# Patient Record
Sex: MALE | Race: BLACK OR AFRICAN AMERICAN | NOT HISPANIC OR LATINO | Employment: FULL TIME | URBAN - METROPOLITAN AREA
[De-identification: names, ages, dates, MRNs, and addresses within clinical notes are randomized per-mention and may not be internally consistent; named-entity substitution may affect disease eponyms.]

---

## 2020-01-01 ENCOUNTER — NURSE TRIAGE (OUTPATIENT)
Dept: NURSING | Facility: CLINIC | Age: 30
End: 2020-01-01

## 2020-01-01 NOTE — TELEPHONE ENCOUNTER
Patient calling. Stats he needs to obtain his immunization records.    Explained that he will need to contact medical records as all of his immunizations are in historical records.    Will call clinic tomorrow for additional assistance.    Protocol and care advice reviewed    Caller states understanding of the recommended disposition    Advised to call back if further questions or concerns      Reason for Disposition    General information question, no triage required and triager able to answer question    Additional Information    Negative: RN needs further essential information from caller in order to complete triage    Negative: Health Information question, no triage required and triager able to answer question    Negative: Requesting regular office appointment    Negative: [1] Caller requesting NON-URGENT health information AND [2] PCP's office is the best resource    Protocols used: INFORMATION ONLY CALL-A-

## 2020-01-01 NOTE — TELEPHONE ENCOUNTER
States he wants appt. C/o sinus and nasal congestion and non-productive cough x2-3 wks. Had sore throat at first but not currently. No fever. Denies SOB. Denies sinus/facial pain. Advised see provider w/i 3 days. Warm transferred to scheduling.     Reason for Disposition    [1] Sinus congestion (pressure, fullness) AND [2] present > 10 days    Additional Information    Negative: Severe difficulty breathing (e.g., struggling for each breath, speaks in single words)    Negative: Sounds like a life-threatening emergency to the triager    Negative: Runny nose is caused by pollen or other allergies    Negative: Cough is main symptom    Negative: Severe sore throat    Negative: Fever > 104 F (40 C)    Negative: [1] Difficulty breathing AND [2] not severe AND [3] not from stuffy nose (e.g., not relieved by cleaning out the nose)    Negative: Patient sounds very sick or weak to the triager    Negative: [1] Fever > 101 F (38.3 C) AND [2] age > 60    Negative: [1] Fever > 100.0 F (37.8 C) AND [2] bedridden (e.g., nursing home patient, CVA, chronic illness, recovering from surgery)    Negative: [1] Fever > 100.0 F (37.8 C) AND [2] diabetes mellitus or weak immune system (e.g., HIV positive, cancer chemo, splenectomy, chronic steroids)    Negative: Fever present > 3 days (72 hours)    Negative: [1] Fever returns after gone for over 24 hours AND [2] symptoms worse or not improved    Negative: [1] Sinus pain (not just congestion) AND [2] fever    Negative: Earache    Negative: [1] SEVERE sore throat AND [2] present > 24 hours    Protocols used: COMMON COLD-A-AH

## 2020-01-02 ENCOUNTER — OFFICE VISIT (OUTPATIENT)
Dept: FAMILY MEDICINE | Facility: CLINIC | Age: 30
End: 2020-01-02
Payer: COMMERCIAL

## 2020-01-02 VITALS
HEART RATE: 97 BPM | RESPIRATION RATE: 16 BRPM | BODY MASS INDEX: 23.68 KG/M2 | SYSTOLIC BLOOD PRESSURE: 120 MMHG | TEMPERATURE: 97.9 F | WEIGHT: 165 LBS | OXYGEN SATURATION: 99 % | DIASTOLIC BLOOD PRESSURE: 76 MMHG

## 2020-01-02 DIAGNOSIS — J01.90 ACUTE SINUSITIS WITH SYMPTOMS > 10 DAYS: Primary | ICD-10-CM

## 2020-01-02 PROCEDURE — 99203 OFFICE O/P NEW LOW 30 MIN: CPT | Performed by: FAMILY MEDICINE

## 2020-01-02 ASSESSMENT — PAIN SCALES - GENERAL: PAINLEVEL: NO PAIN (0)

## 2020-01-02 NOTE — PROGRESS NOTES
Subjective     Macario Pascal is a 29 year old male who presents to clinic today for the following health issues:    HPI   Acute Illness   Acute illness concerns: cough and sinus problems   Onset: 2-3 weeks ago    Fever: no     Chills/Sweats: no     Headache (location?): no     Sinus Pressure:YES    Conjunctivitis:  no    Ear Pain: no    Rhinorrhea: YES    Congestion: YES    Sore Throat: no      Cough: YES    Wheeze: YES    Decreased Appetite: no     Nausea: no     Vomiting: no     Diarrhea:  no     Dysuria/Freq.: no     Fatigue/Achiness: no     Sick/Strep Exposure: no      Therapies Tried and outcome: OTC meds saline nasal rinse sleeping     Patient states he has been having 2 to 3 weeks of nasal congestion, rhinorrhea, sinus pressure, cough.  Sometimes he feels short of breath with wheezing and this is wax and wane during his illness.  He has no history of asthma.  He did previously smoke but quit.  He smoked approximately 1/3 pack/day for 10 years.  He has not had any recent travel or sick contacts.  He has no medication allergies.  No previous surgeries and no prior medical problems.    He denies any rash or urinary symptoms or GI symptoms.    Patient Active Problem List   Diagnosis     CARDIOVASCULAR SCREENING; LDL GOAL LESS THAN 160     Past Surgical History:   Procedure Laterality Date     NO HISTORY OF SURGERY         Social History     Tobacco Use     Smoking status: Former Smoker     Packs/day: 0.33     Years: 10.00     Pack years: 3.30     Smokeless tobacco: Never Used   Substance Use Topics     Alcohol use: No     Family History   Problem Relation Age of Onset     Diabetes Mother      Hypertension Father      Macular Degeneration Father      Prostate Cancer No family hx of      Colon Cancer No family hx of      Breast Cancer No family hx of      Ulcerative Colitis No family hx of      Crohn's Disease No family hx of      Celiac Disease No family hx of          Current Outpatient Medications    Medication Sig Dispense Refill     amoxicillin-clavulanate (AUGMENTIN) 875-125 MG tablet Take 1 tablet by mouth 2 times daily for 10 days 20 tablet 0     No Known Allergies    Reviewed and updated as needed this visit by Provider  Tobacco  Allergies  Meds  Problems  Med Hx  Surg Hx  Fam Hx  Soc Hx        Review of Systems   ROS COMP: Constitutional, HEENT, lymph, Derm, MSK, cardiovascular, pulmonary, gi and gu systems are negative, except as otherwise noted.      Objective    /76 (BP Location: Left arm, Patient Position: Sitting, Cuff Size: Adult Regular)   Pulse 97   Temp 97.9  F (36.6  C) (Temporal)   Resp 16   Wt 74.8 kg (165 lb)   SpO2 99%   BMI 23.68 kg/m    Body mass index is 23.68 kg/m .  Physical Exam   General: Appears well and in no acute distress.  HEENT: Bilateral edematous nasal turbinates.  Eyes grossly normal to inspection. Extraocular movements intact. Pupils equal, round, and reactive to light. Mucous membranes moist. No ulcers or lesions noted in the oropharynx.  Normal TMs and ear canals.  Heme/Lymph: No supraclavicular, anterior, or posterior cervical lymphadenopathy.   Cardiovascular: Regular rate and rhythm, normal S1 and S2 without murmur. No extra heartsounds or friction rub. Radial pulses present and equal bilaterally.  Respiratory: Lungs clear to auscultation bilaterally. No wheezing or crackles. No prolonged expiration. Symmetrical chest rise.  Musculoskeletal:  No gross extremity deformities. No peripheral edema. Normal muscle bulk.     Diagnostic Test Results:  none         Assessment & Plan   1. Acute sinusitis with symptoms > 10 days: Given duration of symptoms will treat with Augmentin x10 days.  Also recommended Afrin Flonase nasal sprays.  Not use Afrin longer than 3 days.  Can use cough drops and over-the-counter cough suppressants.  If not improving recommend PFTs and possible trial of antihistamines.  Follow-up in 1 week if not improving.  Discussed that  cough tends to linger longer than other symptoms.  Patient agreeable with this plan.  - amoxicillin-clavulanate (AUGMENTIN) 875-125 MG tablet; Take 1 tablet by mouth 2 times daily for 10 days  Dispense: 20 tablet; Refill: 0     Return in about 1 week (around 1/9/2020), or if symptoms worsen or fail to improve.    Slava Cordova MD  [facility]     This chart is completed utilizing dictation software; typos and/or incorrect word substitutions may unintentionally occur.

## 2020-01-02 NOTE — PATIENT INSTRUCTIONS
Important Takeaway Points From This Visit:    I have given you an antibiotic to take for your symptoms. Use it twice daily for 10 days.    A over the counter nasal spray that may be helpful is called Afrin. Do not use this longer than 3 days at a time.    For cough, any cough medication with DM (Mucinex DM, Tussin Dm, etc) will help with suppression.      As always, please call with any questions or concerns. I look forward to seeing you again soon!    Take care,  Dr. Cordova    Your current medication list is printed. Please keep this with you - it is helpful to bring this current list to any other medical appointments. It can also be helpful if you ever go to the emergency room or hospital.    If you had lab testing today we will call you with the results. The phone number we will call with your results is # [phone] (home) . If this is not the best number please call our clinic and change the number.    If you need any refills, please call your pharmacy and they will contact us.    If you have any further concerns or wish to schedule another appointment, please call our office at [phone].    If you have a medical emergency, please call [phone].    Thank you for coming to [facility]!

## 2020-01-06 NOTE — PROGRESS NOTES
SUBJECTIVE:   CC: Macario Pascal is an 29 year old male who presents for preventative health visit.     Healthy Habits:     Getting at least 3 servings of Calcium per day:  Yes    Bi-annual eye exam:  NO    Dental care twice a year:  NO    Sleep apnea or symptoms of sleep apnea:  None    Diet:  Regular (no restrictions)    Frequency of exercise:  None    Taking medications regularly:  Yes    Medication side effects:  Not applicable    PHQ-2 Total Score: 0    Additional concerns today:  No    Patient states he will see eye doctor and dentist.    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    Today's PHQ-2 Score:   PHQ-2 ( 1999 Pf[employer]) 1/8/2020   Q1: Little interest or pleasure in doing things 0   Q2: Feeling down, depressed or hopeless 0   PHQ-2 Score 0   Q1: Little interest or pleasure in doing things Not at all   Q2: Feeling down, depressed or hopeless Not at all   PHQ-2 Score 0     Abuse: Current or Past(Physical, Sexual or Emotional)- No  Do you feel safe in your environment? Yes    Social History     Tobacco Use     Smoking status: Former Smoker     Packs/day: 0.33     Years: 10.00     Pack years: 3.30     Smokeless tobacco: Never Used   Substance Use Topics     Alcohol use: No     If you drink alcohol do you typically have >3 drinks per day or >7 drinks per week? No    Alcohol Use 1/8/2020   Prescreen: >3 drinks/day or >7 drinks/week? Not Applicable   Prescreen: >3 drinks/day or >7 drinks/week? -   No flowsheet data found.    Last PSA: Not indicated at this time.    Reviewed orders with patient. Reviewed health maintenance and updated orders accordingly - Yes  BP Readings from Last 3 Encounters:   01/08/20 112/74   01/02/20 120/76   12/26/15 124/82    Wt Readings from Last 3 Encounters:   01/08/20 75.9 kg (167 lb 6.4 oz)   01/02/20 74.8 kg (165 lb)   12/26/15 59 kg (130 lb)                  Patient Active Problem List   Diagnosis     CARDIOVASCULAR SCREENING; LDL  GOAL LESS THAN 160     Past Surgical History:   Procedure Laterality Date     NO HISTORY OF SURGERY         Social History     Tobacco Use     Smoking status: Former Smoker     Packs/day: 0.33     Years: 10.00     Pack years: 3.30     Smokeless tobacco: Never Used   Substance Use Topics     Alcohol use: No     Family History   Problem Relation Age of Onset     Diabetes Mother      Hypertension Father      Macular Degeneration Father      Prostate Cancer No family hx of      Colon Cancer No family hx of      Breast Cancer No family hx of      Ulcerative Colitis No family hx of      Crohn's Disease No family hx of      Celiac Disease No family hx of          Current Outpatient Medications   Medication Sig Dispense Refill     amoxicillin-clavulanate (AUGMENTIN) 875-125 MG tablet Take 1 tablet by mouth 2 times daily for 10 days 20 tablet 0     No Known Allergies    Reviewed and updated as needed this visit by clinical staff  Tobacco  Allergies  Meds  Problems  Med Hx  Surg Hx  Fam Hx  Soc Hx          Reviewed and updated as needed this visit by Provider  Tobacco  Allergies  Meds  Problems  Med Hx  Surg Hx  Fam Hx        Past Medical History:   Diagnosis Date     NO ACTIVE PROBLEMS       Past Surgical History:   Procedure Laterality Date     NO HISTORY OF SURGERY       Review of Systems   Constitutional: Negative for chills and fever.   HENT: Positive for congestion. Negative for ear pain, hearing loss and sore throat.    Eyes: Negative for visual disturbance.   Respiratory: Positive for cough. Negative for shortness of breath.    Cardiovascular: Negative for chest pain, palpitations and peripheral edema.   Gastrointestinal: Negative for abdominal pain, constipation, diarrhea, heartburn, hematochezia and nausea.   Genitourinary: Positive for discharge. Negative for dysuria, frequency, genital sores, hematuria, impotence and urgency.   Musculoskeletal: Negative for arthralgias, joint swelling and myalgias.  "  Skin: Negative for rash.   Neurological: Negative for dizziness, weakness, headaches and paresthesias.   Psychiatric/Behavioral: Negative for mood changes. The patient is not nervous/anxious.      OBJECTIVE:   /74   Pulse 75   Temp 98.6  F (37  C) (Temporal)   Resp 12   Ht 1.765 m (5' 9.5\")   Wt 75.9 kg (167 lb 6.4 oz)   SpO2 99%   BMI 24.37 kg/m      Physical Exam  GENERAL: healthy, alert and no distress  EYES: Eyes grossly normal to inspection, PERRL and conjunctivae and sclerae normal  HENT: ear canals and TM's normal, nose and mouth without ulcers or lesions  NECK: no adenopathy, no asymmetry, masses, or scars and thyroid normal to palpation  RESP: lungs clear to auscultation - no rales, rhonchi or wheezes  CV: regular rate and rhythm, normal S1 S2, no S3 or S4, no murmur, click or rub, no peripheral edema and peripheral pulses strong  ABDOMEN: soft, nontender, no hepatosplenomegaly, no masses and bowel sounds normal  MS: no gross musculoskeletal defects noted, no edema  SKIN: no suspicious lesions or rashes  NEURO: Normal strength and tone, mentation intact and speech normal  PSYCH: mentation appears normal, affect normal/bright  : deferred by patient.    Diagnostic Test Results:  STD screen labs pending    ASSESSMENT/PLAN:   1. Routine general medical examination at a health care facility: Discussed seatbelt safety, helmet safety, sunscreen use, STD screening.  Updated vaccinations.  No other acute concerns today.  Improving from sinus infection previously seen on 1/2/2022.  Not otherwise specifically just today.  Mood good.  - VACCINE ADMINISTRATION, INITIAL  - VACCINE ADMINISTRATION, EACH ADDITIONAL  - TDAP VACCINE (ADACEL)  - INFLUENZA VACCINE IM > 6 MONTHS VALENT IIV4 [45616]  - Neisseria gonorrhoeae PCR  - Chlamydia trachomatis PCR  - HIV Antigen Antibody Combo  - Treponema Abs w Reflex to RPR and Titer    2. Screen for STD (sexually transmitted disease): Patient desires STD screening as " "it has been a while.  Not currently sexually active.  No concerning discharge, dysuria, or other symptoms.  - Neisseria gonorrhoeae PCR  - Chlamydia trachomatis PCR  - HIV Antigen Antibody Combo  - Treponema Abs w Reflex to RPR and Titer    3. Need for Tdap vaccination: Copy given to patient.  - VACCINE ADMINISTRATION, EACH ADDITIONAL  - TDAP VACCINE (ADACEL)    4. Needs flu shot  - VACCINE ADMINISTRATION, INITIAL  - INFLUENZA VACCINE IM > 6 MONTHS VALENT IIV4 [68129]     COUNSELING:   Reviewed preventive health counseling, as reflected in patient instructions       Regular exercise       Healthy diet/nutrition       Vision screening       Hearing screening       Immunizations    Vaccinated for: Influenza and TDAP         Safe sex practices/STD prevention    Estimated body mass index is 24.37 kg/m  as calculated from the following:    Height as of this encounter: 1.765 m (5' 9.5\").    Weight as of this encounter: 75.9 kg (167 lb 6.4 oz).          reports that he has quit smoking. He has a 3.30 pack-year smoking history. He has never used smokeless tobacco.      Counseling Resources:  ATP IV Guidelines  Pooled Cohorts Equation Calculator  FRAX Risk Assessment  ICSI Preventive Guidelines  Dietary Guidelines for Americans, 2010  USDA's MyPlate  ASA Prophylaxis  Lung CA Screening    Slava Cordova MD  [facility]     This chart is completed utilizing dictation software; typos and/or incorrect word substitutions may unintentionally occur.    "

## 2020-01-08 ENCOUNTER — OFFICE VISIT (OUTPATIENT)
Dept: FAMILY MEDICINE | Facility: CLINIC | Age: 30
End: 2020-01-08
Payer: COMMERCIAL

## 2020-01-08 VITALS
RESPIRATION RATE: 12 BRPM | BODY MASS INDEX: 23.96 KG/M2 | HEART RATE: 75 BPM | SYSTOLIC BLOOD PRESSURE: 112 MMHG | OXYGEN SATURATION: 99 % | HEIGHT: 70 IN | DIASTOLIC BLOOD PRESSURE: 74 MMHG | TEMPERATURE: 98.6 F | WEIGHT: 167.4 LBS

## 2020-01-08 DIAGNOSIS — Z00.00 ROUTINE GENERAL MEDICAL EXAMINATION AT A HEALTH CARE FACILITY: Primary | ICD-10-CM

## 2020-01-08 DIAGNOSIS — Z23 NEEDS FLU SHOT: ICD-10-CM

## 2020-01-08 DIAGNOSIS — Z11.3 SCREEN FOR STD (SEXUALLY TRANSMITTED DISEASE): ICD-10-CM

## 2020-01-08 DIAGNOSIS — Z23 NEED FOR TDAP VACCINATION: ICD-10-CM

## 2020-01-08 LAB — HIV 1+2 AB+HIV1 P24 AG SERPL QL IA: NONREACTIVE

## 2020-01-08 PROCEDURE — 87389 HIV-1 AG W/HIV-1&-2 AB AG IA: CPT | Performed by: FAMILY MEDICINE

## 2020-01-08 PROCEDURE — 99395 PREV VISIT EST AGE 18-39: CPT | Mod: 25 | Performed by: FAMILY MEDICINE

## 2020-01-08 PROCEDURE — 90715 TDAP VACCINE 7 YRS/> IM: CPT | Performed by: FAMILY MEDICINE

## 2020-01-08 PROCEDURE — 36415 COLL VENOUS BLD VENIPUNCTURE: CPT | Performed by: FAMILY MEDICINE

## 2020-01-08 PROCEDURE — 87491 CHLMYD TRACH DNA AMP PROBE: CPT | Performed by: FAMILY MEDICINE

## 2020-01-08 PROCEDURE — 90472 IMMUNIZATION ADMIN EACH ADD: CPT | Performed by: FAMILY MEDICINE

## 2020-01-08 PROCEDURE — 90686 IIV4 VACC NO PRSV 0.5 ML IM: CPT | Performed by: FAMILY MEDICINE

## 2020-01-08 PROCEDURE — 87591 N.GONORRHOEAE DNA AMP PROB: CPT | Performed by: FAMILY MEDICINE

## 2020-01-08 PROCEDURE — 90471 IMMUNIZATION ADMIN: CPT | Performed by: FAMILY MEDICINE

## 2020-01-08 PROCEDURE — 86780 TREPONEMA PALLIDUM: CPT | Performed by: FAMILY MEDICINE

## 2020-01-08 ASSESSMENT — ENCOUNTER SYMPTOMS
HEARTBURN: 0
PALPITATIONS: 0
PARESTHESIAS: 0
DIZZINESS: 0
FREQUENCY: 0
JOINT SWELLING: 0
FEVER: 0
CHILLS: 0
WEAKNESS: 0
DIARRHEA: 0
NAUSEA: 0
NERVOUS/ANXIOUS: 0
HEMATOCHEZIA: 0
HEMATURIA: 0
ARTHRALGIAS: 0
SHORTNESS OF BREATH: 0
SORE THROAT: 0
DYSURIA: 0
HEADACHES: 0
COUGH: 1
ABDOMINAL PAIN: 0
CONSTIPATION: 0
MYALGIAS: 0

## 2020-01-08 ASSESSMENT — MIFFLIN-ST. JEOR: SCORE: 1722.63

## 2020-01-09 ENCOUNTER — TELEPHONE (OUTPATIENT)
Dept: FAMILY MEDICINE | Facility: CLINIC | Age: 30
End: 2020-01-09

## 2020-01-09 LAB
C TRACH DNA SPEC QL NAA+PROBE: NEGATIVE
N GONORRHOEA DNA SPEC QL NAA+PROBE: NEGATIVE
SPECIMEN SOURCE: NORMAL
SPECIMEN SOURCE: NORMAL
T PALLIDUM AB SER QL: NONREACTIVE

## 2020-01-09 NOTE — TELEPHONE ENCOUNTER
Left message asking patient to return call.  Please inform patient of RESULTS from Provider below.       Your gonorrhea and chlamydia tests were normal.     Please call the clinic with any questions you may have.     Have a great day,     Dr. Schwab

## 2020-01-10 ENCOUNTER — TELEPHONE (OUTPATIENT)
Dept: FAMILY MEDICINE | Facility: CLINIC | Age: 30
End: 2020-01-10

## 2020-01-10 NOTE — TELEPHONE ENCOUNTER
Spoke to pt.  He will complete his portion of the form.  I email scanned it to patient, per his request.

## 2020-01-10 NOTE — TELEPHONE ENCOUNTER
Reason for call:  Form  Reason for Call:  Form, our goal is to have forms completed with 72 hours, however, some forms may require a visit or additional information.    Type of letter, form or note:  medical    Who is the form from?:  Patient    Where did the form come from: form was faxed in    What clinic location was the form placed at?: [facility] - [phone]    Where the form was placed: [facility]    What number is listed as a contact on the form?:          Additional comments:  Fax to  [facility] [phone] and call pt to inform him of completion.    created by Rachel Ponce

## 2020-01-10 NOTE — TELEPHONE ENCOUNTER
Signed form. Needs to be filled out by patient to fill out. Given to amparo to Fax and let patient know he needs to fill out his section.    Slava Cordova MD  [facility]

## 2020-12-13 ENCOUNTER — HEALTH MAINTENANCE LETTER (OUTPATIENT)
Age: 30
End: 2020-12-13

## 2021-02-21 ENCOUNTER — HEALTH MAINTENANCE LETTER (OUTPATIENT)
Age: 31
End: 2021-02-21

## 2021-09-26 ENCOUNTER — HEALTH MAINTENANCE LETTER (OUTPATIENT)
Age: 31
End: 2021-09-26

## 2022-03-13 ENCOUNTER — HEALTH MAINTENANCE LETTER (OUTPATIENT)
Age: 32
End: 2022-03-13

## 2023-01-14 ENCOUNTER — HEALTH MAINTENANCE LETTER (OUTPATIENT)
Age: 33
End: 2023-01-14

## 2023-04-23 ENCOUNTER — HEALTH MAINTENANCE LETTER (OUTPATIENT)
Age: 33
End: 2023-04-23

## 2023-11-21 ENCOUNTER — OFFICE VISIT (OUTPATIENT)
Dept: URGENT CARE | Facility: URGENT CARE | Age: 33
End: 2023-11-21

## 2023-11-21 ENCOUNTER — NURSE TRIAGE (OUTPATIENT)
Dept: NURSING | Facility: CLINIC | Age: 33
End: 2023-11-21

## 2023-11-21 VITALS
DIASTOLIC BLOOD PRESSURE: 77 MMHG | HEART RATE: 73 BPM | SYSTOLIC BLOOD PRESSURE: 125 MMHG | OXYGEN SATURATION: 99 % | WEIGHT: 178 LBS | RESPIRATION RATE: 16 BRPM | TEMPERATURE: 98.4 F

## 2023-11-21 DIAGNOSIS — H66.93 ACUTE OTITIS MEDIA, BILATERAL: Primary | ICD-10-CM

## 2023-11-21 PROCEDURE — 99203 OFFICE O/P NEW LOW 30 MIN: CPT | Performed by: NURSE PRACTITIONER

## 2023-11-21 RX ORDER — AMOXICILLIN 875 MG
875 TABLET ORAL 2 TIMES DAILY
Qty: 20 TABLET | Refills: 0 | Status: SHIPPED | OUTPATIENT
Start: 2023-11-21 | End: 2023-12-01

## 2023-11-21 NOTE — PROGRESS NOTES
SUBJECTIVE:  Aristeo Pascal is a 33 year old male who presents with bilateral ear pain for 1  day   Severity: moderate   Timing:sudden onset  Additional symptoms include congestion and cough.      History of recurrent otitis: no    History reviewed. No pertinent past medical history.  No current outpatient medications on file.     Social History     Tobacco Use    Smoking status: Never    Smokeless tobacco: Never   Substance Use Topics    Alcohol use: Not on file       ROS:   Review of systems negative except as stated above.    OBJECTIVE:  /77   Pulse 73   Temp 98.4  F (36.9  C) (Tympanic)   Resp 16   Wt 80.7 kg (178 lb)   SpO2 99%    EXAM:  The right TM is bulging and erythematous     The right auditory canal is normal and without drainage, edema or erythema  The left TM is bulging and erythematous  The left auditory canal is normal and without drainage, edema or erythema  Oropharynx exam is normal: no lesions, erythema, adenopathy or exudate.  GENERAL: no acute distress  EYES: EOMI,  PERRL, conjunctiva clear  NECK: supple, non-tender to palpation, no adenopathy noted  RESP: lungs clear to auscultation - no rales, rhonchi or wheezes  CV: regular rates and rhythm, normal S1 S2, no murmur noted  SKIN: no suspicious lesions or rashes     ASSESSMENT:  (H66.93) Acute otitis media, bilateral  (primary encounter diagnosis)    Plan: amoxicillin (AMOXIL) 875 MG tablet  Ibuprofen fluids rest home treat and monitor sx  Call if new or worsening      SERGIO Starkey CNP

## 2024-06-30 ENCOUNTER — HEALTH MAINTENANCE LETTER (OUTPATIENT)
Age: 34
End: 2024-06-30

## 2025-07-13 ENCOUNTER — HEALTH MAINTENANCE LETTER (OUTPATIENT)
Age: 35
End: 2025-07-13